# Patient Record
Sex: FEMALE | Race: WHITE | NOT HISPANIC OR LATINO | URBAN - METROPOLITAN AREA
[De-identification: names, ages, dates, MRNs, and addresses within clinical notes are randomized per-mention and may not be internally consistent; named-entity substitution may affect disease eponyms.]

---

## 2018-11-19 PROBLEM — Z00.00 ENCOUNTER FOR PREVENTIVE HEALTH EXAMINATION: Status: ACTIVE | Noted: 2018-11-19

## 2019-08-09 ENCOUNTER — EMERGENCY (EMERGENCY)
Facility: HOSPITAL | Age: 29
LOS: 0 days | Discharge: LEFT BEFORE DISCHARGE | End: 2019-08-09
Admitting: EMERGENCY MEDICINE

## 2019-08-09 VITALS
OXYGEN SATURATION: 100 % | DIASTOLIC BLOOD PRESSURE: 65 MMHG | SYSTOLIC BLOOD PRESSURE: 128 MMHG | HEART RATE: 82 BPM | TEMPERATURE: 98 F | RESPIRATION RATE: 17 BRPM

## 2019-08-09 DIAGNOSIS — N93.9 ABNORMAL UTERINE AND VAGINAL BLEEDING, UNSPECIFIED: ICD-10-CM

## 2019-08-09 DIAGNOSIS — O20.9 HEMORRHAGE IN EARLY PREGNANCY, UNSPECIFIED: ICD-10-CM

## 2019-08-09 DIAGNOSIS — Z3A.10 10 WEEKS GESTATION OF PREGNANCY: ICD-10-CM

## 2019-08-11 ENCOUNTER — INPATIENT (INPATIENT)
Facility: HOSPITAL | Age: 29
LOS: 0 days | Discharge: HOME | End: 2019-08-11
Attending: OBSTETRICS & GYNECOLOGY | Admitting: OBSTETRICS & GYNECOLOGY
Payer: COMMERCIAL

## 2019-08-11 ENCOUNTER — RESULT REVIEW (OUTPATIENT)
Age: 29
End: 2019-08-11

## 2019-08-11 ENCOUNTER — TRANSCRIPTION ENCOUNTER (OUTPATIENT)
Age: 29
End: 2019-08-11

## 2019-08-11 VITALS
HEART RATE: 72 BPM | SYSTOLIC BLOOD PRESSURE: 115 MMHG | DIASTOLIC BLOOD PRESSURE: 68 MMHG | OXYGEN SATURATION: 99 % | RESPIRATION RATE: 16 BRPM

## 2019-08-11 VITALS
OXYGEN SATURATION: 100 % | RESPIRATION RATE: 18 BRPM | TEMPERATURE: 97 F | DIASTOLIC BLOOD PRESSURE: 76 MMHG | SYSTOLIC BLOOD PRESSURE: 129 MMHG | HEART RATE: 70 BPM | WEIGHT: 119.05 LBS | HEIGHT: 62 IN

## 2019-08-11 LAB
ALBUMIN SERPL ELPH-MCNC: 4.7 G/DL — SIGNIFICANT CHANGE UP (ref 3.5–5.2)
ALP SERPL-CCNC: 62 U/L — SIGNIFICANT CHANGE UP (ref 30–115)
ALT FLD-CCNC: 7 U/L — SIGNIFICANT CHANGE UP (ref 0–41)
ANION GAP SERPL CALC-SCNC: 11 MMOL/L — SIGNIFICANT CHANGE UP (ref 7–14)
APTT BLD: 31.5 SEC — SIGNIFICANT CHANGE UP (ref 27–39.2)
AST SERPL-CCNC: 18 U/L — SIGNIFICANT CHANGE UP (ref 0–41)
BASOPHILS # BLD AUTO: 0.05 K/UL — SIGNIFICANT CHANGE UP (ref 0–0.2)
BASOPHILS NFR BLD AUTO: 0.5 % — SIGNIFICANT CHANGE UP (ref 0–1)
BILIRUB SERPL-MCNC: 0.3 MG/DL — SIGNIFICANT CHANGE UP (ref 0.2–1.2)
BLD GP AB SCN SERPL QL: SIGNIFICANT CHANGE UP
BUN SERPL-MCNC: 10 MG/DL — SIGNIFICANT CHANGE UP (ref 10–20)
CALCIUM SERPL-MCNC: 9.9 MG/DL — SIGNIFICANT CHANGE UP (ref 8.5–10.1)
CHLORIDE SERPL-SCNC: 102 MMOL/L — SIGNIFICANT CHANGE UP (ref 98–110)
CO2 SERPL-SCNC: 25 MMOL/L — SIGNIFICANT CHANGE UP (ref 17–32)
CREAT SERPL-MCNC: 0.5 MG/DL — LOW (ref 0.7–1.5)
EOSINOPHIL # BLD AUTO: 0.23 K/UL — SIGNIFICANT CHANGE UP (ref 0–0.7)
EOSINOPHIL NFR BLD AUTO: 2.3 % — SIGNIFICANT CHANGE UP (ref 0–8)
GLUCOSE SERPL-MCNC: 103 MG/DL — HIGH (ref 70–99)
HCG SERPL-ACNC: 2848 MIU/ML — HIGH
HCT VFR BLD CALC: 38.3 % — SIGNIFICANT CHANGE UP (ref 37–47)
HGB BLD-MCNC: 12.3 G/DL — SIGNIFICANT CHANGE UP (ref 12–16)
IMM GRANULOCYTES NFR BLD AUTO: 0.1 % — SIGNIFICANT CHANGE UP (ref 0.1–0.3)
INR BLD: 1.04 RATIO — SIGNIFICANT CHANGE UP (ref 0.65–1.3)
LYMPHOCYTES # BLD AUTO: 2.96 K/UL — SIGNIFICANT CHANGE UP (ref 1.2–3.4)
LYMPHOCYTES # BLD AUTO: 29.6 % — SIGNIFICANT CHANGE UP (ref 20.5–51.1)
MCHC RBC-ENTMCNC: 28.5 PG — SIGNIFICANT CHANGE UP (ref 27–31)
MCHC RBC-ENTMCNC: 32.1 G/DL — SIGNIFICANT CHANGE UP (ref 32–37)
MCV RBC AUTO: 88.7 FL — SIGNIFICANT CHANGE UP (ref 81–99)
MONOCYTES # BLD AUTO: 0.81 K/UL — HIGH (ref 0.1–0.6)
MONOCYTES NFR BLD AUTO: 8.1 % — SIGNIFICANT CHANGE UP (ref 1.7–9.3)
NEUTROPHILS # BLD AUTO: 5.94 K/UL — SIGNIFICANT CHANGE UP (ref 1.4–6.5)
NEUTROPHILS NFR BLD AUTO: 59.4 % — SIGNIFICANT CHANGE UP (ref 42.2–75.2)
NRBC # BLD: 0 /100 WBCS — SIGNIFICANT CHANGE UP (ref 0–0)
PLATELET # BLD AUTO: 197 K/UL — SIGNIFICANT CHANGE UP (ref 130–400)
POTASSIUM SERPL-MCNC: 4.1 MMOL/L — SIGNIFICANT CHANGE UP (ref 3.5–5)
POTASSIUM SERPL-SCNC: 4.1 MMOL/L — SIGNIFICANT CHANGE UP (ref 3.5–5)
PROT SERPL-MCNC: 7.2 G/DL — SIGNIFICANT CHANGE UP (ref 6–8)
PROTHROM AB SERPL-ACNC: 11.9 SEC — SIGNIFICANT CHANGE UP (ref 9.95–12.87)
RBC # BLD: 4.32 M/UL — SIGNIFICANT CHANGE UP (ref 4.2–5.4)
RBC # FLD: 12.1 % — SIGNIFICANT CHANGE UP (ref 11.5–14.5)
SODIUM SERPL-SCNC: 138 MMOL/L — SIGNIFICANT CHANGE UP (ref 135–146)
WBC # BLD: 10 K/UL — SIGNIFICANT CHANGE UP (ref 4.8–10.8)
WBC # FLD AUTO: 10 K/UL — SIGNIFICANT CHANGE UP (ref 4.8–10.8)

## 2019-08-11 PROCEDURE — 93010 ELECTROCARDIOGRAM REPORT: CPT

## 2019-08-11 PROCEDURE — 76856 US EXAM PELVIC COMPLETE: CPT | Mod: 26

## 2019-08-11 PROCEDURE — 59820 CARE OF MISCARRIAGE: CPT

## 2019-08-11 PROCEDURE — 99285 EMERGENCY DEPT VISIT HI MDM: CPT

## 2019-08-11 PROCEDURE — 99053 MED SERV 10PM-8AM 24 HR FAC: CPT

## 2019-08-11 PROCEDURE — 88304 TISSUE EXAM BY PATHOLOGIST: CPT | Mod: 26

## 2019-08-11 RX ORDER — HYDROMORPHONE HYDROCHLORIDE 2 MG/ML
1 INJECTION INTRAMUSCULAR; INTRAVENOUS; SUBCUTANEOUS
Refills: 0 | Status: DISCONTINUED | OUTPATIENT
Start: 2019-08-11 | End: 2019-08-11

## 2019-08-11 RX ORDER — SODIUM CHLORIDE 9 MG/ML
1000 INJECTION, SOLUTION INTRAVENOUS
Refills: 0 | Status: DISCONTINUED | OUTPATIENT
Start: 2019-08-11 | End: 2019-08-11

## 2019-08-11 RX ORDER — HYDROMORPHONE HYDROCHLORIDE 2 MG/ML
0.5 INJECTION INTRAMUSCULAR; INTRAVENOUS; SUBCUTANEOUS
Refills: 0 | Status: DISCONTINUED | OUTPATIENT
Start: 2019-08-11 | End: 2019-08-11

## 2019-08-11 RX ORDER — KETOROLAC TROMETHAMINE 30 MG/ML
30 SYRINGE (ML) INJECTION ONCE
Refills: 0 | Status: DISCONTINUED | OUTPATIENT
Start: 2019-08-11 | End: 2019-08-11

## 2019-08-11 RX ORDER — OXYCODONE AND ACETAMINOPHEN 5; 325 MG/1; MG/1
1 TABLET ORAL ONCE
Refills: 0 | Status: DISCONTINUED | OUTPATIENT
Start: 2019-08-11 | End: 2019-08-11

## 2019-08-11 RX ADMIN — Medication 30 MILLIGRAM(S): at 09:45

## 2019-08-11 RX ADMIN — SODIUM CHLORIDE 150 MILLILITER(S): 9 INJECTION, SOLUTION INTRAVENOUS at 08:09

## 2019-08-11 RX ADMIN — SODIUM CHLORIDE 100 MILLILITER(S): 9 INJECTION, SOLUTION INTRAVENOUS at 12:12

## 2019-08-11 NOTE — CONSULT NOTE ADULT - ASSESSMENT
29yo  at 10w3d dated by LMP, with active vaginal bleeding likely secondary to an incomplete .   -f/u repeat VS, VS [id number]  -IV fluid hydration   -pad counts   -NPO  -admit to gynecology    Dr. Saba and Dr. Vizcaino to be aware 29yo  at 10w3d dated by LMP, with active vaginal bleeding likely secondary to an incomplete .   -f/u repeat VS, VS [id number]  -IV fluid hydration   -pad counts   -NPO  -admit to gynecology    Dr. Saba and Dr. Vizcaino aware

## 2019-08-11 NOTE — H&P ADULT - NSHPLABSRESULTS_GEN_ALL_CORE
12.3   10.00 )-----------( 197      ( 11 Aug 2019 01:51 )             38.3     HCG Quantitative, Serum: 2848.0 mIU/mL (08-11-19 @ 01:51)  ABO RH Interpretation: B POS (08-11-19 @ 04:23)  Antibody Screen: NEG (08-11-19 @ 04:23)    08-11    138  |  102  |  10  ----------------------------<  103<H>  4.1   |  25  |  0.5<L>    Ca    9.9      11 Aug 2019 01:51    TPro  7.2  /  Alb  4.7  /  TBili  0.3  /  DBili  x   /  AST  18  /  ALT  7   /  AlkPhos  62  08-11  PT/INR - ( 11 Aug 2019 04:23 )   PT: 11.90 sec;   INR: 1.04 ratio    PTT - ( 11 Aug 2019 04:23 )  PTT:31.5 sec        RADIOLOGY & ADDITIONAL STUDIES:  < from: US Pelvis Complete (08.11.19 @ 03:43) >  FINDINGS:   The uterus measures 9.6 x 6.4 x 4.7 cm and contains a single intrauterine   gestational sac with a mean diameter of 1.57 cm, corresponding to a   gestational age of 5 weeks, 6 days. A fetal heart rate is not detected. A   yolk sac is not visualized.  There is no free pelvic fluid.  The right ovary measures 2.4 x 2.1 x 1.9 cm. The left ovary measures 2.4   x 2.1 x 1.9 and contains a corpus luteal cyst measuring 1.5 x1.6 x 1.5   cm.  Doppler flow is demonstrated to both ovaries.  IMPRESSION:  Single intrauterine gestational sac corresponding to a gestational age of   5 weeks, 6 days. No yolk sac visualized. Follow-up beta hCG and pelvic   ultrasound recommended.

## 2019-08-11 NOTE — DISCHARGE NOTE NURSING/CASE MANAGEMENT/SOCIAL WORK - NSDCDPATPORTLINK_GEN_ALL_CORE
You can access the [facility] Patient Portal, offered by [facility], by registering with the following website: [email or web address]

## 2019-08-11 NOTE — BRIEF OPERATIVE NOTE - OPERATION/FINDINGS
normal adnexa bilaterally, exam under anesthesia revealed a 1cm dilated uterus with likely products of conception at cervical os. Moderate amount of products of conception evacuated.

## 2019-08-11 NOTE — ED PROVIDER NOTE - NS ED ROS FT
Constitutional:  No fever, chills, lethargy, or abnormal weight loss  Eyes:  No eye pain or visual changes  ENMT: No nasal discharge, no toothache, no sore throat. No neck pain or stiffness  Cardiac:  No chest pain or palpitations  Respiratory:  No cough or respiratory distress.   GI:  No nausea, vomiting, diarrhea. Lower abdominal cramping.  :  vaginal spotting  MS:  No back or joint pain.  Neuro:  No headache. No numbness, weakness, or tingling.   Skin:  No skin rash  Except as documented in the HPI,  all other systems are negative

## 2019-08-11 NOTE — H&P ADULT - HISTORY OF PRESENT ILLNESS
HPI: 29yo  at 10w3d dated by LMP, with vaginal bleeding and cramping. She reports vaginal bleeding started Thursday initially as spotting, and then progressed to bleeding 2-3 pads, soaked on Friday. She reports cramping which also started on Friday, /10 in intensity, no requiring medication for pain relief. Denies CP, SOB, N/V, LE pain/ swelling, diarrhea, pain/difficulty with urination, fevers, chills. This is a planned and desired pregnancy.     Ob/Gyn History:  , NSVDX2, P1 PTL, 5lbs, P2 FT 7lbs.  LMP - 2019, regular periods, cycles last 5-7 days, occur every 28-30 days  Denies history of ovarian cysts, uterine fibroids, abnormal paps, or STIs  Last Pap Smear - , normal per patient

## 2019-08-11 NOTE — ED ADULT TRIAGE NOTE - CHIEF COMPLAINT QUOTE
Patient states she is 10 weeks pregnant and she is bleeding vaginally. States she has soaked 2 pads since yesterday, and it is dark in color. Patient states she is having abdominal cramping.

## 2019-08-11 NOTE — ASU DISCHARGE PLAN (ADULT/PEDIATRIC) - CARE PROVIDER_API CALL
Evens Vizcaino)  Obstetrics and Gynecology  [address] [place] [address]  Phone: [phone]  Fax: [phone]  Follow Up Time:

## 2019-08-11 NOTE — ED PROVIDER NOTE - CLINICAL SUMMARY MEDICAL DECISION MAKING FREE TEXT BOX
Patient seen and evaluated by Ob/GYN.  ED work up reviewed and patient has elevated beta HCG with only a gestational sac on Ultrasound.  + Vaginal bleeding.  Patient being admitted to Ob/GYN for D+C in the OR. VS reviewed. Patient and  spoken to in detail about results and plan of care.

## 2019-08-11 NOTE — ED PROVIDER NOTE - CARE PLAN
Principal Discharge DX:	Incomplete miscarriage  Secondary Diagnosis:	Vaginal bleeding in pregnancy, first trimester

## 2019-08-11 NOTE — ED PROVIDER NOTE - OBJECTIVE STATEMENT
27 y/o F no PMH who presents with vaginal bleeding and cramping during pregnancy. LMP was 10 week ago.  You had 2 other pregnancies and 2 children. She reports vaginal bleeding started Thursday initially as spotting, and then progressed to bleeding 2-3 pads, soaked on Friday. She reports cramping which also started on Friday, 5/10 in intensity, no requiring medication for pain relief. Denies CP, SOB, N/V, LE pain/ swelling, diarrhea, pain/difficulty with urination, fevers, chills. This is a planned and desired pregnancy.   Patient denies fever. She has mild lower abdominal cramping, 3/10, associated with bleeding, non-radiating, improved with rest,

## 2019-08-11 NOTE — ED PROVIDER NOTE - PHYSICAL EXAMINATION
VITAL SIGNS: I have reviewed nursing notes and confirm.  CONSTITUTIONAL: well-appearing, non-toxic, NAD  SKIN: Warm dry, normal skin turgor  HEAD: NCAT  EYES: EOMI, PERRLA, no scleral icterus  ENT: Moist mucous membranes, normal pharynx with no erythema or exudates  NECK: Supple; non tender. Full ROM. No cervical LAD  CARD: RRR, no murmurs, rubs or gallops  RESP: clear to ausculation b/l.  No rales, rhonchi, or wheezing.  ABD: soft, + BS, non-tender, non-distended, no rebound or guarding. No CVA tenderness  : Performed by Ob/GYN  EXT: Full ROM, no bony tenderness, no pedal edema, no calf tenderness  NEURO: normal motor. normal sensory. CN II-XII intact. Cerebellar testing normal. Normal gait.  PSYCH: Cooperative, appropriate.

## 2019-08-11 NOTE — CONSULT NOTE ADULT - SUBJECTIVE AND OBJECTIVE BOX
Chief Complaint:    HPI: 27yo  at 10w3d dated by LMP, with vaginal bleeding and cramping. She reports vaginal bleeding started Thursday initially as spotting, and then progressed to bleeding 2-3 pads, soaked on Friday. She reports cramping which also started on Friday, /10 in intensity, no requiring medication for pain relief. Denies CP, SOB, N/V, LE pain/ swelling, diarrhea, pain/difficulty with urination, fevers, chills. This is a planned and desired pregnancy.     Ob/Gyn History:  , NSVDX2, P1 PTL, 5lbs, P2 FT 7lbs.  LMP - 2019, regular periods, cycles last 5-7 days, occur every 28-30 days  Denies history of ovarian cysts, uterine fibroids, abnormal paps, or STIs  Last Pap Smear - 2018, normal per patient     Denies the following: constitutional symptoms, visual symptoms, cardiovascular symptoms, respiratory symptoms, GI symptoms, musculoskeletal symptoms, skin symptoms, neurologic symptoms, hematologic symptoms, allergic symptoms, psychiatric symptoms  Except any pertinent positives listed.     Home Medications: Denies    Allergies: No Known Allergies    PAST MEDICAL & SURGICAL HISTORY: Denies    FAMILY HISTORY: Denies    SOCIAL HISTORY: Denies cigarette use, alcohol use, or illicit drug use    Vital Signs Last 24 Hrs  T(F): 96.6 (11 Aug 2019 01:01), Max: 96.6 (11 Aug 2019 01:01)  HR: 70 (11 Aug 2019 01:01) (70 - 70)  BP: 129/76 (11 Aug 2019 01:01) (129/76 - 129/76)  RR: 18 (11 Aug 2019 01:01) (18 - 18)    General Appearance - AAOx3, NAD  Heart - S1S2 regular rate and rhythm  Lung - CTAB  Abdomen - Soft, nontender, nondistended, no rebound, no rigidity, no guarding, bowel sounds present    GYN/Pelvis:  Labia Majora - Normal  Labia Minora - Normal  Clitoris - Normal  Urethra - Normal  Vagina - 10cc of blood present in vaginal canal, blood clots present, no tissue noted.  Cervix - Tissue present at cervical os, 1cm dilated.     Uterus:  Size -8w GA  Tenderness - None  Mass - None  Freely mobile    Adnexa:  Masses - None  Tenderness - None      LABS:                        12.3   10.00 )-----------( 197      ( 11 Aug 2019 01:51 )             38.3     HCG Quantitative, Serum: 2848.0 mIU/mL (19 @ 01:51)  ABO RH Interpretation: B POS (19 @ 04:23)  Antibody Screen: NEG (19 @ 04:23)        138  |  102  |  10  ----------------------------<  103<H>  4.1   |  25  |  0.5<L>    Ca    9.9      11 Aug 2019 01:51    TPro  7.2  /  Alb  4.7  /  TBili  0.3  /  DBili  x   /  AST  18  /  ALT  7   /  AlkPhos  62    PT/INR - ( 11 Aug 2019 04:23 )   PT: 11.90 sec;   INR: 1.04 ratio    PTT - ( 11 Aug 2019 04:23 )  PTT:31.5 sec        RADIOLOGY & ADDITIONAL STUDIES:  < from: US Pelvis Complete (19 @ 03:43) >  FINDINGS:   The uterus measures 9.6 x 6.4 x 4.7 cm and contains a single intrauterine   gestational sac with a mean diameter of 1.57 cm, corresponding to a   gestational age of 5 weeks, 6 days. A fetal heart rate is not detected. A   yolk sac is not visualized.  There is no free pelvic fluid.  The right ovary measures 2.4 x 2.1 x 1.9 cm. The left ovary measures 2.4   x 2.1 x 1.9 and contains a corpus luteal cyst measuring 1.5 x1.6 x 1.5   cm.  Doppler flow is demonstrated to both ovaries.  IMPRESSION:  Single intrauterine gestational sac corresponding to a gestational age of   5 weeks, 6 days. No yolk sac visualized. Follow-up beta hCG and pelvic   ultrasound recommended.  < end of copied text > Chief Complaint: vaginal bleeding    HPI: 29yo  at 10w3d dated by LMP, with vaginal bleeding and cramping. She reports vaginal bleeding started Thursday initially as spotting, and then progressed to bleeding 2-3 pads, soaked on Friday. She reports cramping which also started on Friday, 5/10 in intensity, no requiring medication for pain relief. Denies CP, SOB, N/V, LE pain/ swelling, diarrhea, pain/difficulty with urination, fevers, chills. This is a planned and desired pregnancy.     Ob/Gyn History:  , NSVDX2, P1 PTL, 5lbs, P2 FT 7lbs.  LMP - 2019, regular periods, cycles last 5-7 days, occur every 28-30 days  Denies history of ovarian cysts, uterine fibroids, abnormal paps, or STIs  Last Pap Smear - 2018, normal per patient     Denies the following: constitutional symptoms, visual symptoms, cardiovascular symptoms, respiratory symptoms, GI symptoms, musculoskeletal symptoms, skin symptoms, neurologic symptoms, hematologic symptoms, allergic symptoms, psychiatric symptoms  Except any pertinent positives listed.     Home Medications: Denies    Allergies: No Known Allergies    PAST MEDICAL & SURGICAL HISTORY: Denies    FAMILY HISTORY: Denies    SOCIAL HISTORY: Denies cigarette use, alcohol use, or illicit drug use    Vital Signs Last 24 Hrs  T(F): 96.6 (11 Aug 2019 01:01), Max: 96.6 (11 Aug 2019 01:01)  HR: 70 (11 Aug 2019 01:01) (70 - 70)  BP: 129/76 (11 Aug 2019 01:01) (129/76 - 129/76)  RR: 18 (11 Aug 2019 01:01) (18 - 18)    General Appearance - AAOx3, NAD  Heart - S1S2 regular rate and rhythm  Lung - CTAB  Abdomen - Soft, nontender, nondistended, no rebound, no rigidity, no guarding, bowel sounds present    GYN/Pelvis:  Labia Majora - Normal  Labia Minora - Normal  Clitoris - Normal  Urethra - Normal  Vagina - 10cc of blood present in vaginal canal, blood clots present, no tissue noted.  Cervix - Tissue present at cervical os, 1cm dilated.     Uterus:  Size -8w GA  Tenderness - None  Mass - None  Freely mobile    Adnexa:  Masses - None  Tenderness - None      LABS:                        12.3   10.00 )-----------( 197      ( 11 Aug 2019 01:51 )             38.3     HCG Quantitative, Serum: 2848.0 mIU/mL (19 @ 01:51)  ABO RH Interpretation: B POS (19 @ 04:23)  Antibody Screen: NEG (19 @ 04:23)        138  |  102  |  10  ----------------------------<  103<H>  4.1   |  25  |  0.5<L>    Ca    9.9      11 Aug 2019 01:51    TPro  7.2  /  Alb  4.7  /  TBili  0.3  /  DBili  x   /  AST  18  /  ALT  7   /  AlkPhos  62    PT/INR - ( 11 Aug 2019 04:23 )   PT: 11.90 sec;   INR: 1.04 ratio    PTT - ( 11 Aug 2019 04:23 )  PTT:31.5 sec        RADIOLOGY & ADDITIONAL STUDIES:  < from: US Pelvis Complete (19 @ 03:43) >  FINDINGS:   The uterus measures 9.6 x 6.4 x 4.7 cm and contains a single intrauterine   gestational sac with a mean diameter of 1.57 cm, corresponding to a   gestational age of 5 weeks, 6 days. A fetal heart rate is not detected. A   yolk sac is not visualized.  There is no free pelvic fluid.  The right ovary measures 2.4 x 2.1 x 1.9 cm. The left ovary measures 2.4   x 2.1 x 1.9 and contains a corpus luteal cyst measuring 1.5 x1.6 x 1.5   cm.  Doppler flow is demonstrated to both ovaries.  IMPRESSION:  Single intrauterine gestational sac corresponding to a gestational age of   5 weeks, 6 days. No yolk sac visualized. Follow-up beta hCG and pelvic   ultrasound recommended.  < end of copied text >

## 2019-08-11 NOTE — BRIEF OPERATIVE NOTE - NSICDXBRIEFPROCEDURE_GEN_ALL_CORE_FT
PROCEDURES:  Dilation and curettage, uterus, using suction, for incomplete  11-Aug-2019 12:15:09  Bhumika Jones

## 2019-08-11 NOTE — H&P ADULT - NSHPPHYSICALEXAM_GEN_ALL_CORE
General Appearance - AAOx3, NAD  Heart - S1S2 regular rate and rhythm  Lung - CTAB  Abdomen - Soft, nontender, nondistended, no rebound, no rigidity, no guarding, bowel sounds present    GYN/Pelvis:  Labia Majora - Normal  Labia Minora - Normal  Clitoris - Normal  Urethra - Normal  Vagina - 10cc of blood present in vaginal canal, blood clots present, no tissue noted.  Cervix - Tissue present at cervical os, 1cm dilated.     Uterus:  Size -8w GA  Tenderness - None  Mass - None  Freely mobile    Adnexa:  Masses - None  Tenderness - None General Appearance - AAOx3, NAD  Heart - S1S2 regular rate and rhythm  Lung - CTAB  Abdomen - Soft, nontender, nondistended, no rebound, no rigidity, no guarding, bowel sounds present    GYN/Pelvis:  Labia Majora - Normal  Labia Minora - Normal  Clitoris - Normal  Urethra - Normal  Vagina - 10cc of blood present in vaginal canal, blood clots present, no tissue noted.  Cervix - Tissue present at cervical os, 1cm dilated, slow active bleeding noted    Uterus:  Size -8w GA  Tenderness - None  Mass - None  Freely mobile    Adnexa:  Masses - None  Tenderness - None

## 2019-08-11 NOTE — ASU DISCHARGE PLAN (ADULT/PEDIATRIC) - ASU DC SPECIAL INSTRUCTIONSFT
nothing in the vagina for 2 weeks, no tampons, no douching, no baths, no sex. Showers are fine.   Go to the emergency room if you have a temperature greater than 100.4, worsening abdominal pain or increased vaginal bleeding nothing in the vagina for 2 weeks, no tampons, no douching, no baths, no sex. Showers are fine.   Go to the emergency room if you have a temperature greater than 100.4, worsening abdominal pain or increased vaginal bleeding    f/u in the office in 2 weeks

## 2019-08-15 DIAGNOSIS — O03.4 INCOMPLETE SPONTANEOUS ABORTION WITHOUT COMPLICATION: ICD-10-CM

## 2019-08-15 LAB — SURGICAL PATHOLOGY STUDY: SIGNIFICANT CHANGE UP

## 2020-01-07 PROBLEM — Z78.9 OTHER SPECIFIED HEALTH STATUS: Chronic | Status: ACTIVE | Noted: 2019-08-11

## 2020-02-03 ENCOUNTER — APPOINTMENT (OUTPATIENT)
Dept: ANTEPARTUM | Facility: CLINIC | Age: 30
End: 2020-02-03

## 2020-02-04 ENCOUNTER — APPOINTMENT (OUTPATIENT)
Dept: ANTEPARTUM | Facility: CLINIC | Age: 30
End: 2020-02-04
Payer: COMMERCIAL

## 2020-02-04 ENCOUNTER — ASOB RESULT (OUTPATIENT)
Age: 30
End: 2020-02-04

## 2020-02-04 ENCOUNTER — OUTPATIENT (OUTPATIENT)
Dept: OUTPATIENT SERVICES | Facility: HOSPITAL | Age: 30
LOS: 1 days | Discharge: HOME | End: 2020-02-04

## 2020-02-04 PROCEDURE — 76816 OB US FOLLOW-UP PER FETUS: CPT | Mod: 26,59

## 2020-03-03 ENCOUNTER — APPOINTMENT (OUTPATIENT)
Dept: ANTEPARTUM | Facility: CLINIC | Age: 30
End: 2020-03-03